# Patient Record
Sex: FEMALE | Race: WHITE | NOT HISPANIC OR LATINO | Employment: STUDENT | URBAN - METROPOLITAN AREA
[De-identification: names, ages, dates, MRNs, and addresses within clinical notes are randomized per-mention and may not be internally consistent; named-entity substitution may affect disease eponyms.]

---

## 2024-07-02 ENCOUNTER — APPOINTMENT (OUTPATIENT)
Dept: PRIMARY CARE | Facility: CLINIC | Age: 29
End: 2024-07-02
Payer: COMMERCIAL

## 2024-07-02 VITALS
TEMPERATURE: 97.3 F | SYSTOLIC BLOOD PRESSURE: 104 MMHG | BODY MASS INDEX: 25.79 KG/M2 | HEIGHT: 65 IN | WEIGHT: 154.8 LBS | DIASTOLIC BLOOD PRESSURE: 60 MMHG | OXYGEN SATURATION: 99 % | HEART RATE: 89 BPM

## 2024-07-02 DIAGNOSIS — Z82.49 FAMILY HISTORY OF CORONARY ARTERY DISEASE: ICD-10-CM

## 2024-07-02 DIAGNOSIS — N92.6 IRREGULAR MENSES: Primary | ICD-10-CM

## 2024-07-02 DIAGNOSIS — Z00.00 HEALTHCARE MAINTENANCE: ICD-10-CM

## 2024-07-02 DIAGNOSIS — F90.0 ATTENTION DEFICIT HYPERACTIVITY DISORDER (ADHD), PREDOMINANTLY INATTENTIVE TYPE: ICD-10-CM

## 2024-07-02 DIAGNOSIS — E55.9 VITAMIN D DEFICIENCY: ICD-10-CM

## 2024-07-02 DIAGNOSIS — Z13.220 LIPID SCREENING: ICD-10-CM

## 2024-07-02 PROCEDURE — 1036F TOBACCO NON-USER: CPT | Performed by: FAMILY MEDICINE

## 2024-07-02 PROCEDURE — 99204 OFFICE O/P NEW MOD 45 MIN: CPT | Performed by: FAMILY MEDICINE

## 2024-07-02 RX ORDER — ATOMOXETINE 80 MG/1
80 CAPSULE ORAL DAILY
COMMUNITY
Start: 2024-05-19

## 2024-07-02 RX ORDER — ESCITALOPRAM OXALATE 20 MG/1
20 TABLET ORAL DAILY
COMMUNITY

## 2024-07-02 ASSESSMENT — PATIENT HEALTH QUESTIONNAIRE - PHQ9
1. LITTLE INTEREST OR PLEASURE IN DOING THINGS: NOT AT ALL
SUM OF ALL RESPONSES TO PHQ9 QUESTIONS 1 AND 2: 0
2. FEELING DOWN, DEPRESSED OR HOPELESS: NOT AT ALL

## 2024-07-02 NOTE — PROGRESS NOTES
"Subjective   Chief complaint: Tania Corona is a 29 y.o. female who presents for Establish Care (Patient is in office today for a new patient visit. ).    HPI:  Here to become established with our practice.  Moved here from [place] about 4 years ago.    Has not had a PCP.  Has not been seen recently by primary care.  No recent labs.  Has been see by her psychiatrist who treats her ADHD, anxiety and depression.  She is feeling fine today.    Concerned because she has a strong family history of CAD on her mother's side of the family,  Would like to have a PCP.  Concerned also because she has had irregular menses; they have \"never been regular.\"      PMH:  sees psychiatry  Depression  Anxiety  ADHD    GYN History:  Had been on Mirena for 5-6 years.  At that time did not have her menstrual cycle.  Mirena was removed over a year ago and has not yet had a regular cycle.  She is interested in seeing Gynecology for a consultation.    Family History:  CAD - mother had an MI at age 52  CAD - maternal grandfather with 3 MIs.  CAD - 3v-CABG in a maternal grandmother  Pacemaker - maternal uncle  CHF in father's relative - presumed due to alcohol        Objective   /60 (BP Location: Right arm, Patient Position: Sitting, BP Cuff Size: Adult)   Pulse 89   Temp 36.3 °C (97.3 °F) (Temporal)   Ht 1.651 m (5' 5\")   Wt 70.2 kg (154 lb 12.8 oz)   SpO2 99% Comment: RA  BMI 25.76 kg/m²   Physical Exam  General:  Alert, oriented, no acute distress  Eyes:  Sclerae white, PER, conjunctivae clear  ENT:  No nasal congestion.    Neck: Supple  Endocrine:  No thyromegaly.   Respiratory:  Normal breath sounds.  No wheezing, rhonchi nor crackles.  No dyspnea.  Cardiovascular:  S1 and S2 positive.  Regular rate and rhythm.  No gallops.  No murmurs.  Vascular:  No edema.  Skin warm and dry.  CNS:  No gross neurological deficits.  Gait within normal limits.    Psychiatric:  Affect is positive and appropriate.  No depression.  No " "anxiety.    Review of Systems   I have reviewed and reconciled the medication list with the patient today.   Current Outpatient Medications:     atomoxetine (Strattera) 80 mg capsule, Take 1 capsule (80 mg) by mouth once daily., Disp: , Rfl:     escitalopram (Lexapro) 20 mg tablet, Take 1 tablet (20 mg) by mouth once daily., Disp: , Rfl:      Imaging:  No results found.     Labs reviewed:    No results found for: \"WBC\", \"HGB\", \"HCT\", \"PLT\", \"CHOL\", \"TRIG\", \"HDL\", \"LDL\", \"ALT\", \"AST\", \"NA\", \"K\", \"CL\", \"CREATININE\", \"BUN\", \"CO2\", \"TSH\", \"PSA\", \"INR\", \"GLUF\", \"HGBA1C\", \"ALBUR\"    Assessment/Plan   Problem List Items Addressed This Visit       Attention deficit hyperactivity disorder (ADHD), predominantly inattentive type     This is managed by her psychiatrist.         Family history of coronary artery disease     Lab order on chart.  Will manage risk factors, pending lab results.         Healthcare maintenance     Age appropriate preventive measures reviewed and discussed.  Diet and exercise recommendations discussed.             Relevant Orders    CBC and Auto Differential    TSH with reflex to Free T4 if abnormal    Hepatitis C Antibody    HIV 1/2 Antigen/Antibody Screen with Reflex to Confirmation    Vitamin D 25-Hydroxy,Total (for eval of Vitamin D levels)    Referral to Gynecology    Irregular menses - Primary     Referral given.  Will also check thyroid labs.         Relevant Orders    Referral to Gynecology    Vitamin D deficiency     On a supplement.  Lab order on chart.         Relevant Orders    Vitamin D 25-Hydroxy,Total (for eval of Vitamin D levels)     Other Visit Diagnoses       Lipid screening        Relevant Orders    Comprehensive Metabolic Panel    Lipid Panel            Continue current medications as listed  Follow up pending lab results.     "

## 2024-07-30 ENCOUNTER — APPOINTMENT (OUTPATIENT)
Dept: OBSTETRICS AND GYNECOLOGY | Facility: CLINIC | Age: 29
End: 2024-07-30
Payer: COMMERCIAL

## 2024-07-30 VITALS
DIASTOLIC BLOOD PRESSURE: 70 MMHG | SYSTOLIC BLOOD PRESSURE: 112 MMHG | WEIGHT: 152.7 LBS | BODY MASS INDEX: 25.44 KG/M2 | HEIGHT: 65 IN

## 2024-07-30 DIAGNOSIS — N92.6 IRREGULAR MENSES: Primary | ICD-10-CM

## 2024-07-30 DIAGNOSIS — Z00.00 HEALTHCARE MAINTENANCE: ICD-10-CM

## 2024-07-30 NOTE — PROGRESS NOTES
"GYNECOLOGY PROGRESS NOTE        CC:  Patient here to establish care. Patient had Mirena for years and only had some spotting. She had her Mirena removed 5/2023 and since then her menses is still not regular. She skips months, longest being 4 months. Patient states she has never had normal  regular menses even since menarche. She just had a menses this month. Patient use to have BV frequently when she had an IUD but since having it out she has had no issues. Last pap 2023 which was normal. She had an abnormal pap in 2019 with biopsy and then the repeat was wnl and in 2023 pap was wnl. Next pap due 2026, will obtain records from previous provider [place]. No breast concerns. No other issues.   Chief Complaint   Patient presents with    New Patient Visit     New patient visit.   Patient has always have irregular periods. Can go months without a period. Did have the mirena for 5 years. Had it removed last year in may. Patient states she didn't have any periods with it. No ultrasound ever done.        HPI:  Tania Corona is here for a routine GYN examination.  No GYN c/o, no AUB.      Contraception:  none  Pregnancy plans:  none  Gardasil:  no    Depression screen:  negative      ROS:  GI - no blood in BMs  URO - no hematuria  GYN - no AUB or vaginal discharge  PSYCH - mood OK        PHYSICAL EXAM:  /70 (BP Location: Left arm, Patient Position: Sitting, BP Cuff Size: Adult)   Ht 1.651 m (5' 5\")   Wt 69.3 kg (152 lb 11.2 oz)   LMP 07/10/2024   BMI 25.41 kg/m²   GEN:  A&O, NAD  DERM:  no hirsutism or acne   PSYCH:  normal affect, non-anxious      IMPRESSION/PLAN:    A: Pap 2023 normal. Menses irregular after Mirena was removed.  Plan: 1.  Pap due 2026. 2. Will call if she wants menstrual regulation. 3. Obtain previous provider records for pap results.   Problem List Items Addressed This Visit       Healthcare maintenance    Irregular menses        Pap normal 2023, no Hx of HGSIL, next due in 3 " years.  Annual STD screening done per CDC guidelines if applicable (if < age 25).      ADEN Urena